# Patient Record
Sex: FEMALE | Race: WHITE | NOT HISPANIC OR LATINO | Employment: FULL TIME | URBAN - METROPOLITAN AREA
[De-identification: names, ages, dates, MRNs, and addresses within clinical notes are randomized per-mention and may not be internally consistent; named-entity substitution may affect disease eponyms.]

---

## 2024-06-18 ENCOUNTER — PATIENT ROUNDING (BHMG ONLY) (OUTPATIENT)
Dept: ENDOCRINOLOGY | Facility: CLINIC | Age: 39
End: 2024-06-18
Payer: COMMERCIAL

## 2024-06-18 ENCOUNTER — LAB (OUTPATIENT)
Dept: LAB | Facility: HOSPITAL | Age: 39
End: 2024-06-18
Payer: COMMERCIAL

## 2024-06-18 ENCOUNTER — OFFICE VISIT (OUTPATIENT)
Dept: ENDOCRINOLOGY | Facility: CLINIC | Age: 39
End: 2024-06-18
Payer: COMMERCIAL

## 2024-06-18 VITALS
OXYGEN SATURATION: 99 % | HEIGHT: 67 IN | HEART RATE: 76 BPM | WEIGHT: 236 LBS | BODY MASS INDEX: 37.04 KG/M2 | DIASTOLIC BLOOD PRESSURE: 72 MMHG | SYSTOLIC BLOOD PRESSURE: 135 MMHG

## 2024-06-18 DIAGNOSIS — E05.90 HYPERTHYROIDISM: Primary | ICD-10-CM

## 2024-06-18 DIAGNOSIS — E05.90 HYPERTHYROIDISM: ICD-10-CM

## 2024-06-18 LAB
T3FREE SERPL-MCNC: 4.61 PG/ML (ref 2–4.4)
T4 FREE SERPL-MCNC: 1.8 NG/DL (ref 0.92–1.68)
TSH SERPL DL<=0.05 MIU/L-ACNC: 0.01 UIU/ML (ref 0.27–4.2)

## 2024-06-18 PROCEDURE — 84481 FREE ASSAY (FT-3): CPT

## 2024-06-18 PROCEDURE — 84445 ASSAY OF TSI GLOBULIN: CPT

## 2024-06-18 PROCEDURE — 84439 ASSAY OF FREE THYROXINE: CPT

## 2024-06-18 PROCEDURE — 86376 MICROSOMAL ANTIBODY EACH: CPT

## 2024-06-18 PROCEDURE — 99204 OFFICE O/P NEW MOD 45 MIN: CPT | Performed by: INTERNAL MEDICINE

## 2024-06-18 PROCEDURE — 84443 ASSAY THYROID STIM HORMONE: CPT

## 2024-06-18 PROCEDURE — 36415 COLL VENOUS BLD VENIPUNCTURE: CPT

## 2024-06-18 RX ORDER — NORETHINDRONE ACETATE AND ETHINYL ESTRADIOL 1MG-20(21)
1 KIT ORAL DAILY
COMMUNITY
Start: 2024-04-16

## 2024-06-18 RX ORDER — NORETHINDRONE ACETATE AND ETHINYL ESTRADIOL .02; 1 MG/1; MG/1
TABLET ORAL
COMMUNITY
Start: 2024-06-15

## 2024-06-18 RX ORDER — CETIRIZINE HYDROCHLORIDE 10 MG/1
1 TABLET ORAL DAILY
COMMUNITY

## 2024-06-18 RX ORDER — SEMAGLUTIDE 0.25 MG/.5ML
INJECTION, SOLUTION SUBCUTANEOUS
COMMUNITY
Start: 2024-06-17

## 2024-06-18 NOTE — PROGRESS NOTES
Endocrine Consult Outpatient  Referred by [facility] for hyperthyroidism consultation  Patient Care Team:  Alexis Rowland MD as PCP - General (Endocrinology)     Chief Complaint: Hyperthyroidism         HPI: This is a 39-year-old female with no significant past medical history went for routine blood test with her family physician and was found to have a low TSH and is now referred here for further evaluation management.  Patient has not been taking any thyroid medications in the past and is currently not taking any thyroid medication.  She is not taking any supplements like biotin or any other supplement.  She tells me that she has this issue of heat intolerance which is going on for last 10 years but she has recently noticed some fatigue and tiredness.  She denies any tremors, sweating, palpitations.  She may have a mild insomnia with trouble falling into sleep.  She denies any shortness of breath.  She does have mother with history of Graves' disease.    Denies any issues with eyes including blurred vision or double vision.    History reviewed. No pertinent past medical history.    Social History     Socioeconomic History    Marital status:      Spouse name: Vinicius    Number of children: 0    Years of education: 14   Tobacco Use    Smoking status: Never    Smokeless tobacco: Never   Vaping Use    Vaping status: Never Used   Substance and Sexual Activity    Alcohol use: Never    Drug use: Never    Sexual activity: Defer       Family History   Problem Relation Age of Onset    Thyroid disease Mother     Hypertension Father        No Known Allergies    ROS:   Constitutional:  Admit fatigue, tiredness.    Eyes:  Denies change in visual acuity   HENT:  Denies nasal congestion or sore throat   Respiratory: denies cough, shortness of breath.   Cardiovascular:  denies chest pain, edema   GI:  Denies abdominal pain, nausea, vomiting.    :  Denies dysuria   Musculoskeletal:  Denies back pain or joint  pain   Integument:  Denies dry skin, rash   Neurologic:  Denies headache, focal weakness or sensory changes   Endocrine:  Denies polyuria or polydipsia   Psychiatric:  Denies depression, admit anxiety      Vitals:    06/18/24 0849   BP: 135/72   Pulse: 76   SpO2: 99%     Body mass index is 36.96 kg/m².      Physical Exam:  GEN: NAD, conversant  EYES: EOMI, PERRL  NECK: no thyromegaly, full ROM   CV: RRR  LUNG: CTA  SKIN: no rashes, no acanthosis  MSK: no deformities,   NEURO: no tremors, DTR normal  PSYCH: Awake and coherent      Results Review:     I reviewed the patient's new clinical results.    Data reviewed: Labs from June 7, 2024 showed a serum sodium 136, potassium was 4.6, chloride 107, CO2 24.9, glucose 90, BUN 12 with creatinine 0.62, AST 16 and ALT 14  TSH was 0.03 with free T41.48  B12 was 276.  Anti-TPO antibodies were elevated at 2327  Antithyroglobulin antibodies were elevated at 193.  CBC showed white count was 5.4 with hemoglobin 13 with hematocrit 42 and platelet count was 222    Medication Review: Reviewed.       Current Outpatient Medications:     Blisovi FE 1/20 1-20 MG-MCG per tablet, Take 1 tablet by mouth Daily., Disp: , Rfl:     cetirizine (ZyrTEC Allergy) 10 MG tablet, Take 1 tablet by mouth Daily., Disp: , Rfl:     Semaglutide-Weight Management (Wegovy) 0.25 MG/0.5ML solution auto-injector, Inject by subcutaneous route for 28 days., Disp: , Rfl:     norethindrone-ethinyl estradiol (Loestrin 1/20, 21,) 1-20 MG-MCG per tablet, , Disp: , Rfl:         Assessment and plan:  Hyperthyroidism: Uncontrolled, This is a 39-year-old female with no significant past medical history was found to have low TSH with normal free T4 on routine blood test.  Her TPO antibodies and thyroglobulin antibodies are positive.  She is relatively asymptomatic does have some symptoms with fatigue and tiredness may be some heat intolerance.  Her mother has history of Graves' disease.  She definitely have autoimmune  thyroid disease.  I am not sure whether she has Graves' disease at this time.  I will recheck TSH with free T4, free T3 and TSI antibodies.  I will check thyroid ultrasound.  She may need thyroid scan and uptake down the road.  She is clinically doing well at this time so we will hold off on the beta-blockers for now.  I explained this to her and all questions were answered.    Thank you very much for the consultation.    Alexis Rowland MD FACE.

## 2024-06-18 NOTE — PROGRESS NOTES
June 18, 2024    Hello, may I speak with Sima Esquivel?    My name is Pooja     I am  with [facility]  [address] [place] [address].    Before we get started may I verify your date of birth? 1985    I am calling to officially welcome you to our practice and ask about your recent visit. Is this a good time to talk? yes    Tell me about your visit with us. What things went well?  nice doctor       We're always looking for ways to make our patients' experiences even better. Do you have recommendations on ways we may improve?  no    Overall were you satisfied with your first visit to our practice? yes       I appreciate you taking the time to speak with me today. Is there anything else I can do for you? no      Thank you, and have a great day.

## 2024-06-19 LAB — THYROPEROXIDASE AB SERPL-ACNC: 218 IU/ML (ref 0–34)

## 2024-06-21 DIAGNOSIS — E05.90 HYPERTHYROIDISM: Primary | ICD-10-CM

## 2024-06-21 LAB — TSI SER-ACNC: 12.6 IU/L (ref 0–0.55)

## 2024-06-21 NOTE — PROGRESS NOTES
Labs consistent with Graves' disease.  Recommend thyroid scan and uptake.  Please arrange.  Order placed.

## 2024-07-10 ENCOUNTER — HOSPITAL ENCOUNTER (OUTPATIENT)
Dept: NUCLEAR MEDICINE | Facility: HOSPITAL | Age: 39
Discharge: HOME | End: 2024-07-10
Payer: COMMERCIAL

## 2024-07-10 DIAGNOSIS — E05.90 HYPERTHYROIDISM: ICD-10-CM

## 2024-07-10 PROCEDURE — A9516 IODINE I-123 SOD IODIDE MIC: HCPCS | Performed by: INTERNAL MEDICINE

## 2024-07-10 PROCEDURE — 78014 THYROID IMAGING W/BLOOD FLOW: CPT

## 2024-07-10 PROCEDURE — 0 SODIUM IODIDE 3.7 MBQ CAPSULE: Performed by: INTERNAL MEDICINE

## 2024-07-10 RX ORDER — SODIUM IODIDE I 123 100 UCI/1
1 CAPSULE, GELATIN COATED ORAL
Status: COMPLETED | OUTPATIENT
Start: 2024-07-10 | End: 2024-07-10

## 2024-07-10 RX ADMIN — SODIUM IODIDE I 123 1 CAPSULE: 100 CAPSULE, GELATIN COATED ORAL at 13:29

## 2024-07-11 ENCOUNTER — HOSPITAL ENCOUNTER (OUTPATIENT)
Dept: NUCLEAR MEDICINE | Facility: HOSPITAL | Age: 39
Discharge: HOME | End: 2024-07-11
Payer: COMMERCIAL

## 2024-07-11 ENCOUNTER — HOSPITAL ENCOUNTER (OUTPATIENT)
Dept: ULTRASOUND IMAGING | Facility: HOSPITAL | Age: 39
Discharge: HOME | End: 2024-07-11
Admitting: INTERNAL MEDICINE
Payer: COMMERCIAL

## 2024-07-11 DIAGNOSIS — E05.90 HYPERTHYROIDISM: ICD-10-CM

## 2024-07-11 PROCEDURE — 76536 US EXAM OF HEAD AND NECK: CPT

## 2024-07-30 ENCOUNTER — OFFICE VISIT (OUTPATIENT)
Dept: ENDOCRINOLOGY | Facility: CLINIC | Age: 39
End: 2024-07-30
Payer: COMMERCIAL

## 2024-07-30 VITALS
HEIGHT: 67 IN | SYSTOLIC BLOOD PRESSURE: 135 MMHG | HEART RATE: 64 BPM | BODY MASS INDEX: 37.04 KG/M2 | DIASTOLIC BLOOD PRESSURE: 72 MMHG | OXYGEN SATURATION: 100 % | WEIGHT: 236 LBS

## 2024-07-30 DIAGNOSIS — E05.90 HYPERTHYROIDISM: Primary | ICD-10-CM

## 2024-07-30 PROCEDURE — 99214 OFFICE O/P EST MOD 30 MIN: CPT | Performed by: INTERNAL MEDICINE

## 2024-07-30 RX ORDER — METHIMAZOLE 10 MG/1
10 TABLET ORAL DAILY
Qty: 30 TABLET | Refills: 3 | Status: SHIPPED | OUTPATIENT
Start: 2024-07-30 | End: 2025-07-30

## 2024-07-30 RX ORDER — AMOXICILLIN 875 MG/1
1 TABLET, COATED ORAL EVERY 12 HOURS
COMMUNITY
Start: 2024-07-08 | End: 2024-07-30

## 2024-07-30 RX ORDER — POLYMYXIN B SULFATE AND TRIMETHOPRIM 1; 10000 MG/ML; [USP'U]/ML
SOLUTION OPHTHALMIC
COMMUNITY
Start: 2024-07-13

## 2024-07-30 NOTE — PROGRESS NOTES
Endocrine Progress Note Outpatient     Patient Care Team:  Alexis Rowland MD as PCP - General (Endocrinology)    Chief Complaint: Follow-up hyperthyroidism          HPI: This is a 39-year-old female who was seen here back in June for a abnormal low TSH subsequently underwent complete workup which did show that she does have hyperthyroidism due to Graves' disease and she is now here for follow-up and discussion for treatment options.  She does admit some fatigue and tiredness with some heat intolerance she also have noticed maybe slight insomnia and mood swings.  She denies any tremors or palpitations.  Weight is unchanged.  She denies any significant symptoms with the vision.    History reviewed. No pertinent past medical history.    Social History     Socioeconomic History    Marital status:      Spouse name: Vinicius    Number of children: 0    Years of education: 14   Tobacco Use    Smoking status: Never    Smokeless tobacco: Never   Vaping Use    Vaping status: Never Used   Substance and Sexual Activity    Alcohol use: Never    Drug use: Never    Sexual activity: Defer       Family History   Problem Relation Age of Onset    Thyroid disease Mother     Hypertension Father        No Known Allergies    ROS:   Constitutional:  Admit fatigue, tiredness.    Eyes:  Denies change in visual acuity   HENT:  Denies nasal congestion or sore throat   Respiratory: denies cough, Admit shortness of breath.   Cardiovascular:  denies chest pain, edema   GI:  Denies abdominal pain, nausea, vomiting.   Musculoskeletal:  Denies back pain or joint pain   Integument:  Denies dry skin and rash   Neurologic:  Denies headache, focal weakness or sensory changes   Endocrine:  Denies polyuria or polydipsia   Psychiatric:  Denies depression, admit anxiety      Vitals:    07/30/24 0802   BP: 135/72   Pulse: 64   SpO2: 100%     Body mass index is 36.96 kg/m².     Physical Exam:  GEN: NAD, conversant  EYES: EOMI, PERRL,  NECK: no  thyromegaly,   CV: RRR  LUNG: CTA  PSYCH: Awake and coherent      Results Review:     I reviewed the patient's new clinical results.      Lab Results   Component Value Date    TSH 0.010 (L) 06/18/2024    FREET4 1.80 (H) 06/18/2024    THYROIDAB 218 (H) 06/18/2024     T4, Free (06/18/2024 09:33)    T3, Free (06/18/2024 09:33)    Thyroid Stimulating Immunoglobulin (06/18/2024 09:33)    Thyroid Peroxidase Antibody (06/18/2024 09:33)    TSH (06/18/2024 09:33)    NM Thyroid Uptake & Scan (07/11/2024 14:04)    US Thyroid (07/11/2024 14:35)    Medication Review: Reviewed.       Current Outpatient Medications:     Blisovi FE 1/20 1-20 MG-MCG per tablet, Take 1 tablet by mouth Daily., Disp: , Rfl:     cetirizine (ZyrTEC Allergy) 10 MG tablet, Take 1 tablet by mouth Daily., Disp: , Rfl:     Semaglutide-Weight Management (Wegovy) 0.25 MG/0.5ML solution auto-injector, Inject by subcutaneous route for 28 days., Disp: , Rfl:     trimethoprim-polymyxin b (POLYTRIM) 50358-9.1 UNIT/ML-% ophthalmic solution, INSTILL 1 DROP IN LEFT EYE EVERY 4 HOURS FOR 7 DAYS, Disp: , Rfl:           Assessment and plan:  Hyperthyroidism due to Graves' disease: Uncontrolled with low TSH and elevated free T4 and free T3.  TSI was elevated.  We discussed all 3 treatment options including antithyroid medications, radioactive iodine ablation and surgery.  Each option was discussed in detail.  At this time we have decided to go proceed with antithyroid medications.  I will add methimazole 10 mg p.o. daily and follow labs in about 6 to 8 weeks.  She is advised to call me if she develops any abdominal pain, nausea or vomiting or frequent infections.  She is not planning for pregnancy at this time or in the future.  She is advised to use contraception.           Alexis Rowland MD FACE.

## 2024-07-30 NOTE — PATIENT INSTRUCTIONS
Start methimazole 10 mg p.o. daily  If you develop any abdominal pain, nausea, vomiting or frequent infections then please call me  Labs before follow-up.

## 2024-09-12 ENCOUNTER — LAB (OUTPATIENT)
Dept: LAB | Facility: HOSPITAL | Age: 39
End: 2024-09-12
Payer: COMMERCIAL

## 2024-09-12 DIAGNOSIS — E05.90 HYPERTHYROIDISM: ICD-10-CM

## 2024-09-12 LAB
ALBUMIN SERPL-MCNC: 4 G/DL (ref 3.5–5.2)
ALBUMIN/GLOB SERPL: 1.3 G/DL
ALP SERPL-CCNC: 69 U/L (ref 39–117)
ALT SERPL W P-5'-P-CCNC: 14 U/L (ref 1–33)
ANION GAP SERPL CALCULATED.3IONS-SCNC: 8.5 MMOL/L (ref 5–15)
AST SERPL-CCNC: 13 U/L (ref 1–32)
BASOPHILS # BLD AUTO: 0.04 10*3/MM3 (ref 0–0.2)
BASOPHILS NFR BLD AUTO: 0.8 % (ref 0–1.5)
BILIRUB SERPL-MCNC: 0.3 MG/DL (ref 0–1.2)
BUN SERPL-MCNC: 11 MG/DL (ref 6–20)
BUN/CREAT SERPL: 14.1 (ref 7–25)
CALCIUM SPEC-SCNC: 9.4 MG/DL (ref 8.6–10.5)
CHLORIDE SERPL-SCNC: 105 MMOL/L (ref 98–107)
CO2 SERPL-SCNC: 23.5 MMOL/L (ref 22–29)
CREAT SERPL-MCNC: 0.78 MG/DL (ref 0.57–1)
DEPRECATED RDW RBC AUTO: 38.9 FL (ref 37–54)
EGFRCR SERPLBLD CKD-EPI 2021: 100.5 ML/MIN/1.73
EOSINOPHIL # BLD AUTO: 0.09 10*3/MM3 (ref 0–0.4)
EOSINOPHIL NFR BLD AUTO: 1.7 % (ref 0.3–6.2)
ERYTHROCYTE [DISTWIDTH] IN BLOOD BY AUTOMATED COUNT: 12.7 % (ref 12.3–15.4)
GLOBULIN UR ELPH-MCNC: 3 GM/DL
GLUCOSE SERPL-MCNC: 85 MG/DL (ref 65–99)
HCT VFR BLD AUTO: 39 % (ref 34–46.6)
HGB BLD-MCNC: 13 G/DL (ref 12–15.9)
IMM GRANULOCYTES # BLD AUTO: 0.01 10*3/MM3 (ref 0–0.05)
IMM GRANULOCYTES NFR BLD AUTO: 0.2 % (ref 0–0.5)
LYMPHOCYTES # BLD AUTO: 1.64 10*3/MM3 (ref 0.7–3.1)
LYMPHOCYTES NFR BLD AUTO: 31.1 % (ref 19.6–45.3)
MCH RBC QN AUTO: 28.5 PG (ref 26.6–33)
MCHC RBC AUTO-ENTMCNC: 33.3 G/DL (ref 31.5–35.7)
MCV RBC AUTO: 85.5 FL (ref 79–97)
MONOCYTES # BLD AUTO: 0.33 10*3/MM3 (ref 0.1–0.9)
MONOCYTES NFR BLD AUTO: 6.3 % (ref 5–12)
NEUTROPHILS NFR BLD AUTO: 3.17 10*3/MM3 (ref 1.7–7)
NEUTROPHILS NFR BLD AUTO: 59.9 % (ref 42.7–76)
NRBC BLD AUTO-RTO: 0 /100 WBC (ref 0–0.2)
PLATELET # BLD AUTO: 199 10*3/MM3 (ref 140–450)
PMV BLD AUTO: 11.4 FL (ref 6–12)
POTASSIUM SERPL-SCNC: 4.7 MMOL/L (ref 3.5–5.2)
PROT SERPL-MCNC: 7 G/DL (ref 6–8.5)
RBC # BLD AUTO: 4.56 10*6/MM3 (ref 3.77–5.28)
SODIUM SERPL-SCNC: 137 MMOL/L (ref 136–145)
T4 FREE SERPL-MCNC: 1.25 NG/DL (ref 0.93–1.7)
TSH SERPL DL<=0.05 MIU/L-ACNC: 1.2 UIU/ML (ref 0.27–4.2)
WBC NRBC COR # BLD AUTO: 5.28 10*3/MM3 (ref 3.4–10.8)

## 2024-09-12 PROCEDURE — 84439 ASSAY OF FREE THYROXINE: CPT

## 2024-09-12 PROCEDURE — 36415 COLL VENOUS BLD VENIPUNCTURE: CPT

## 2024-09-12 PROCEDURE — 80050 GENERAL HEALTH PANEL: CPT

## 2024-09-20 ENCOUNTER — OFFICE VISIT (OUTPATIENT)
Dept: ENDOCRINOLOGY | Facility: CLINIC | Age: 39
End: 2024-09-20
Payer: COMMERCIAL

## 2024-09-20 VITALS
HEIGHT: 67 IN | SYSTOLIC BLOOD PRESSURE: 130 MMHG | WEIGHT: 229 LBS | DIASTOLIC BLOOD PRESSURE: 75 MMHG | BODY MASS INDEX: 35.94 KG/M2 | HEART RATE: 83 BPM | OXYGEN SATURATION: 98 %

## 2024-09-20 DIAGNOSIS — E05.90 HYPERTHYROIDISM: Primary | ICD-10-CM

## 2024-09-20 PROCEDURE — 99213 OFFICE O/P EST LOW 20 MIN: CPT | Performed by: INTERNAL MEDICINE

## 2024-09-20 RX ORDER — SEMAGLUTIDE 0.5 MG/.5ML
INJECTION, SOLUTION SUBCUTANEOUS
COMMUNITY
End: 2024-09-20

## 2024-09-20 RX ORDER — METHIMAZOLE 10 MG/1
10 TABLET ORAL DAILY
Qty: 30 TABLET | Refills: 3 | Status: SHIPPED | OUTPATIENT
Start: 2024-09-20 | End: 2025-09-20

## 2024-09-20 RX ORDER — SEMAGLUTIDE 1 MG/.5ML
INJECTION, SOLUTION SUBCUTANEOUS
COMMUNITY
Start: 2024-07-18

## 2024-09-25 ENCOUNTER — TELEPHONE (OUTPATIENT)
Dept: ENDOCRINOLOGY | Facility: CLINIC | Age: 39
End: 2024-09-25

## 2024-09-25 NOTE — TELEPHONE ENCOUNTER
Caller: CLARISA FAIR    Relationship to patient: SELF    Best call back number: [phone]    Patient is needing: PATIENT IS CALLING AND WANTING TO KNOW IF HER LAB RESULTS CAN BE FAXED OVER TO HER PCP PLEASE/ PLEASE ADVISE

## 2024-12-04 LAB
T4 FREE SERPL-MCNC: 1.33 NG/DL (ref 0.82–1.77)
TSH SERPL DL<=0.005 MIU/L-ACNC: 2.71 UIU/ML (ref 0.45–4.5)

## 2024-12-04 RX ORDER — SEMAGLUTIDE 1.7 MG/.75ML
INJECTION, SOLUTION SUBCUTANEOUS
COMMUNITY
Start: 2024-09-25

## 2024-12-10 ENCOUNTER — OFFICE VISIT (OUTPATIENT)
Dept: ENDOCRINOLOGY | Facility: CLINIC | Age: 39
End: 2024-12-10
Payer: COMMERCIAL

## 2024-12-10 VITALS
OXYGEN SATURATION: 99 % | WEIGHT: 218 LBS | HEART RATE: 83 BPM | DIASTOLIC BLOOD PRESSURE: 72 MMHG | HEIGHT: 67 IN | BODY MASS INDEX: 34.21 KG/M2 | SYSTOLIC BLOOD PRESSURE: 132 MMHG

## 2024-12-10 DIAGNOSIS — E05.90 HYPERTHYROIDISM: Primary | ICD-10-CM

## 2024-12-10 PROCEDURE — 99213 OFFICE O/P EST LOW 20 MIN: CPT | Performed by: INTERNAL MEDICINE

## 2024-12-10 RX ORDER — METHIMAZOLE 10 MG/1
10 TABLET ORAL DAILY
Qty: 30 TABLET | Refills: 6 | Status: SHIPPED | OUTPATIENT
Start: 2024-12-10 | End: 2025-12-10

## 2024-12-10 RX ORDER — SEMAGLUTIDE 2.4 MG/.75ML
INJECTION, SOLUTION SUBCUTANEOUS
COMMUNITY
Start: 2024-11-27

## 2024-12-10 NOTE — PROGRESS NOTES
Endocrine Progress Note Outpatient     Patient Care Team:  Anahi Moy APRN as PCP - General (Nurse Practitioner)    Chief Complaint: Follow-up hyperthyroidism    HPI: This is a 39-year-old female with history of hyperthyroidism due to Graves' disease is here for follow-up.  She is currently on methimazole 10 mg once a day.  She feels like methimazole has helped her with feeling better and sleeping better.  Methimazole was started in July 2024.    She was prescribed by her her primary care physician Wegovy prescription and she has lost 25 pounds so far and she is tolerating it well.    History reviewed. No pertinent past medical history.    Social History     Socioeconomic History    Marital status:      Spouse name: Vinicius    Number of children: 0    Years of education: 14   Tobacco Use    Smoking status: Never    Smokeless tobacco: Never   Vaping Use    Vaping status: Never Used   Substance and Sexual Activity    Alcohol use: Never    Drug use: Never    Sexual activity: Defer       Family History   Problem Relation Age of Onset    Thyroid disease Mother     Hypertension Father        No Known Allergies    ROS:   Constitutional:  Denies fatigue, tiredness.    Eyes:  Denies change in visual acuity   HENT:  Denies nasal congestion or sore throat   Respiratory: denies cough, Denies shortness of breath.   Cardiovascular:  denies chest pain, edema   GI:  Denies abdominal pain, nausea, vomiting.   Musculoskeletal:  Denies back pain or joint pain   Integument:  Denies dry skin and rash   Neurologic:  Denies headache, focal weakness or sensory changes   Endocrine:  Denies polyuria or polydipsia   Psychiatric:  Denies depression, admit anxiety      Vitals:    12/10/24 0835   BP: 132/72   Pulse: 83   SpO2: 99%     Body mass index is 34.14 kg/m².     Physical Exam:  GEN: NAD, conversant  EYES: EOMI, PERRL,  NECK: no thyromegaly,   CV: RRR  LUNG: CTA  PSYCH: Awake and coherent      Results Review:     I  reviewed the patient's new clinical results.      Lab Results   Component Value Date    TSH 2.710 12/03/2024    FREET4 1.33 12/03/2024    THYROIDAB 218 (H) 06/18/2024     T4, Free (06/18/2024 09:33)    T3, Free (06/18/2024 09:33)    Thyroid Stimulating Immunoglobulin (06/18/2024 09:33)    Thyroid Peroxidase Antibody (06/18/2024 09:33)    TSH (06/18/2024 09:33)    NM Thyroid Uptake & Scan (07/11/2024 14:04)    US Thyroid (07/11/2024 14:35)    Medication Review: Reviewed.       Current Outpatient Medications:     Wegovy 2.4 MG/0.75ML solution auto-injector, ADMINISTER 2.4 MG UNDER THE SKIN EVERY WEEK, Disp: , Rfl:     Blisovi FE 1/20 1-20 MG-MCG per tablet, Take 1 tablet by mouth Daily., Disp: , Rfl:     cetirizine (ZyrTEC Allergy) 10 MG tablet, Take 1 tablet by mouth Daily., Disp: , Rfl:     methIMAzole (TAPAZOLE) 10 MG tablet, Take 1 tablet by mouth Daily., Disp: 30 tablet, Rfl: 3      Assessment and plan:  Hyperthyroidism due to Graves' disease: Well-controlled with normal TSH and free T4.  Clinically doing very well, at this time we will continue methimazole 10 mg once a day.  She has been on methimazole since July 2024.  She is advised to call me if she develops any abdominal pain, nausea, vomiting or frequent infections.    Assessment and plan from September 20, 2024 reviewed and updated.       Alexis Rowland MD FACE.

## 2025-04-07 ENCOUNTER — TELEPHONE (OUTPATIENT)
Dept: ENDOCRINOLOGY | Facility: CLINIC | Age: 40
End: 2025-04-07
Payer: COMMERCIAL

## 2025-07-11 ENCOUNTER — TELEPHONE (OUTPATIENT)
Dept: ENDOCRINOLOGY | Facility: CLINIC | Age: 40
End: 2025-07-11

## 2025-07-16 DIAGNOSIS — E05.90 HYPERTHYROIDISM: ICD-10-CM

## 2025-07-17 LAB
ALBUMIN SERPL-MCNC: 4.3 G/DL (ref 3.9–4.9)
ALP SERPL-CCNC: 72 IU/L (ref 44–121)
ALT SERPL-CCNC: 16 IU/L (ref 0–32)
AST SERPL-CCNC: 13 IU/L (ref 0–40)
BASOPHILS # BLD AUTO: 0 X10E3/UL (ref 0–0.2)
BASOPHILS NFR BLD AUTO: 0 %
BILIRUB SERPL-MCNC: 0.3 MG/DL (ref 0–1.2)
BUN SERPL-MCNC: 19 MG/DL (ref 6–20)
BUN/CREAT SERPL: 26 (ref 9–23)
CALCIUM SERPL-MCNC: 9.4 MG/DL (ref 8.7–10.2)
CHLORIDE SERPL-SCNC: 101 MMOL/L (ref 96–106)
CO2 SERPL-SCNC: 23 MMOL/L (ref 20–29)
CREAT SERPL-MCNC: 0.73 MG/DL (ref 0.57–1)
EGFRCR SERPLBLD CKD-EPI 2021: 108 ML/MIN/1.73
EOSINOPHIL # BLD AUTO: 0.1 X10E3/UL (ref 0–0.4)
EOSINOPHIL NFR BLD AUTO: 1 %
ERYTHROCYTE [DISTWIDTH] IN BLOOD BY AUTOMATED COUNT: 12.6 % (ref 11.7–15.4)
GLOBULIN SER CALC-MCNC: 2.9 G/DL (ref 1.5–4.5)
GLUCOSE SERPL-MCNC: 83 MG/DL (ref 70–99)
HCT VFR BLD AUTO: 40.3 % (ref 34–46.6)
HGB BLD-MCNC: 12.9 G/DL (ref 11.1–15.9)
IMM GRANULOCYTES # BLD AUTO: 0 X10E3/UL (ref 0–0.1)
IMM GRANULOCYTES NFR BLD AUTO: 0 %
LYMPHOCYTES # BLD AUTO: 2 X10E3/UL (ref 0.7–3.1)
LYMPHOCYTES NFR BLD AUTO: 28 %
MCH RBC QN AUTO: 29.4 PG (ref 26.6–33)
MCHC RBC AUTO-ENTMCNC: 32 G/DL (ref 31.5–35.7)
MCV RBC AUTO: 92 FL (ref 79–97)
MONOCYTES # BLD AUTO: 0.4 X10E3/UL (ref 0.1–0.9)
MONOCYTES NFR BLD AUTO: 5 %
NEUTROPHILS # BLD AUTO: 4.8 X10E3/UL (ref 1.4–7)
NEUTROPHILS NFR BLD AUTO: 66 %
PLATELET # BLD AUTO: 219 X10E3/UL (ref 150–450)
POTASSIUM SERPL-SCNC: 4.7 MMOL/L (ref 3.5–5.2)
PROT SERPL-MCNC: 7.2 G/DL (ref 6–8.5)
RBC # BLD AUTO: 4.39 X10E6/UL (ref 3.77–5.28)
SODIUM SERPL-SCNC: 136 MMOL/L (ref 134–144)
T4 FREE SERPL-MCNC: 1.18 NG/DL (ref 0.82–1.77)
TSH SERPL DL<=0.005 MIU/L-ACNC: 3.04 UIU/ML (ref 0.45–4.5)
WBC # BLD AUTO: 7.3 X10E3/UL (ref 3.4–10.8)

## 2025-07-24 ENCOUNTER — OFFICE VISIT (OUTPATIENT)
Dept: ENDOCRINOLOGY | Facility: CLINIC | Age: 40
End: 2025-07-24
Payer: COMMERCIAL

## 2025-07-24 VITALS
OXYGEN SATURATION: 99 % | HEIGHT: 67 IN | HEART RATE: 90 BPM | SYSTOLIC BLOOD PRESSURE: 135 MMHG | BODY MASS INDEX: 32.65 KG/M2 | DIASTOLIC BLOOD PRESSURE: 72 MMHG | WEIGHT: 208 LBS

## 2025-07-24 DIAGNOSIS — E05.90 HYPERTHYROIDISM: Primary | ICD-10-CM

## 2025-07-24 PROCEDURE — 99213 OFFICE O/P EST LOW 20 MIN: CPT | Performed by: INTERNAL MEDICINE

## 2025-07-24 NOTE — PROGRESS NOTES
Endocrine Progress Note Outpatient     Patient Care Team:  Anahi Moy APRN as PCP - General (Nurse Practitioner)    Chief Complaint: Follow-up hyperthyroidism    HPI: This is a 40-year-old female with history of hyperthyroidism due to Graves' disease is here for follow-up.  She is currently on methimazole 5 mg once a day.  She feels like methimazole has helped her with feeling better and sleeping better.  Methimazole was started in July 2024.    She was prescribed by her her primary care physician Wegovy prescription and she has lost 35 pounds so far and she is tolerating it well.    History reviewed. No pertinent past medical history.    Social History     Socioeconomic History    Marital status:      Spouse name: Vinicius    Number of children: 0    Years of education: 14   Tobacco Use    Smoking status: Never    Smokeless tobacco: Never   Vaping Use    Vaping status: Never Used   Substance and Sexual Activity    Alcohol use: Never    Drug use: Never    Sexual activity: Defer       Family History   Problem Relation Age of Onset    Thyroid disease Mother     Hypertension Father        No Known Allergies    ROS:   Constitutional:  Denies fatigue, tiredness.    Eyes:  Denies change in visual acuity   HENT:  Denies nasal congestion or sore throat   Respiratory: denies cough, Denies shortness of breath.   Cardiovascular:  denies chest pain, edema   GI:  Denies abdominal pain, nausea, vomiting.   Musculoskeletal:  Denies back pain or joint pain   Integument:  Denies dry skin and rash   Neurologic:  Denies headache, focal weakness or sensory changes   Endocrine:  Denies polyuria or polydipsia   Psychiatric:  Denies depression, admit anxiety      Vitals:    07/24/25 0820   BP: 135/72   Pulse: 90   SpO2: 99%     Body mass index is 32.58 kg/m².     Physical Exam:  GEN: NAD, conversant  EYES: EOMI, PERRL,  NECK: no thyromegaly,   CV: RRR  LUNG: CTA  PSYCH: Awake and coherent      Results Review:     I reviewed  the patient's new clinical results.      Lab Results   Component Value Date    TSH 3.040 07/16/2025    FREET4 1.18 07/16/2025    THYROIDAB 218 (H) 06/18/2024     T4, Free (06/18/2024 09:33)    T3, Free (06/18/2024 09:33)    Thyroid Stimulating Immunoglobulin (06/18/2024 09:33)    Thyroid Peroxidase Antibody (06/18/2024 09:33)    TSH (06/18/2024 09:33)    NM Thyroid Uptake & Scan (07/11/2024 14:04)    US Thyroid (07/11/2024 14:35)    Medication Review: Reviewed.       Current Outpatient Medications:     Blisovi FE 1/20 1-20 MG-MCG per tablet, Take 1 tablet by mouth Daily., Disp: , Rfl:     cetirizine (ZyrTEC Allergy) 10 MG tablet, Take 1 tablet by mouth Daily., Disp: , Rfl:     methIMAzole (TAPAZOLE) 10 MG tablet, Take 1 tablet by mouth Daily. (Patient taking differently: Take 1 tablet by mouth Daily. Pt takes half tablet daily.), Disp: 30 tablet, Rfl: 6    Wegovy 2.4 MG/0.75ML solution auto-injector, ADMINISTER 2.4 MG UNDER THE SKIN EVERY WEEK, Disp: , Rfl:       Assessment and plan:  Hyperthyroidism due to Graves' disease: Controlled with normal TSH and free T4.  She is currently on methimazole 5 mg p.o. daily.  Will continue that.  She has been on methimazole since July 2024.  Will see her back in 3 months and if remains euthyroid we will further try to decrease the dose of methimazole.  Side effects of methimazole has been discussed with her in the past and she is advised to call me if she develop any abdominal pain, nausea or vomiting or frequent infections.  She verbalized understanding.    Assessment and plan from December 10, 2024 reviewed and updated.           Alexis Rowland MD FACE.